# Patient Record
Sex: MALE | Race: WHITE | RURAL
[De-identification: names, ages, dates, MRNs, and addresses within clinical notes are randomized per-mention and may not be internally consistent; named-entity substitution may affect disease eponyms.]

---

## 2018-06-11 ENCOUNTER — OFFICE VISIT (OUTPATIENT)
Dept: FAMILY MEDICINE CLINIC | Age: 68
End: 2018-06-11

## 2018-06-11 VITALS
DIASTOLIC BLOOD PRESSURE: 60 MMHG | OXYGEN SATURATION: 94 % | RESPIRATION RATE: 17 BRPM | SYSTOLIC BLOOD PRESSURE: 110 MMHG | HEART RATE: 64 BPM | BODY MASS INDEX: 29.66 KG/M2 | WEIGHT: 223.8 LBS | HEIGHT: 73 IN

## 2018-06-11 DIAGNOSIS — Z11.59 NEED FOR HEPATITIS C SCREENING TEST: ICD-10-CM

## 2018-06-11 DIAGNOSIS — Z13.6 ENCOUNTER FOR LIPID SCREENING FOR CARDIOVASCULAR DISEASE: ICD-10-CM

## 2018-06-11 DIAGNOSIS — G89.29 CHRONIC PAIN OF RIGHT ANKLE: Primary | ICD-10-CM

## 2018-06-11 DIAGNOSIS — Z13.220 ENCOUNTER FOR LIPID SCREENING FOR CARDIOVASCULAR DISEASE: ICD-10-CM

## 2018-06-11 DIAGNOSIS — E04.9 GOITER: ICD-10-CM

## 2018-06-11 DIAGNOSIS — M25.571 CHRONIC PAIN OF RIGHT ANKLE: Primary | ICD-10-CM

## 2018-06-11 NOTE — PROGRESS NOTES
Chief Complaint   Patient presents with    Foot Swelling     lump on the back of right foot. No painful, just sensitive. HPI:      Richelle Lucero is a 74 y.o. male. Goes by [employer]. History of overactive thyroid with a goiter. Did not get US ordered previously. New Issues:  He has a \"growth\" on his right heel that he would like looked at. He first noticed it a few months ago. Seems to be where his tendon and muscle meet. Feels like a hard lump. Worse depending on what shoes he is wearing. Sensitive to the touch, but not painful. No Known Allergies    No current outpatient prescriptions on file. No current facility-administered medications for this visit. Past Medical History:   Diagnosis Date    Fx wrist     left    Tick bite     2011       No past surgical history on file. Social History     Social History    Marital status:      Spouse name: N/A    Number of children: N/A    Years of education: N/A     Social History Main Topics    Smoking status: Never Smoker    Smokeless tobacco: Never Used    Alcohol use Yes      Comment: rare    Drug use: No    Sexual activity: Not Asked     Other Topics Concern    None     Social History Narrative       Family History   Problem Relation Age of Onset    Cancer Brother      colon    Heart Disease Neg Hx     Diabetes Neg Hx        Above history reviewed. ROS:  Denies fever, chills, cough, chest pain, SOB,  nausea, vomiting, or diarrhea. Denies wt loss, wt gain, hemoptysis, hematochezia or melena.     Physical Examination:    /60 (BP 1 Location: Left arm, BP Patient Position: Sitting)  Pulse 64  Resp 17  Ht 6' 0.5\" (1.842 m)  Wt 223 lb 12.8 oz (101.5 kg)  SpO2 94%  BMI 29.94 kg/m2    General: Alert and Ox3, Fluent speech  Neck:  Supple, no adenopathy, JVD, mass or bruit  Chest:  Clear to Ausculation, without wheezes, rales, rubs or ronchi  Cardiac: RRR  Extremities:  No cyanosis, clubbing or edema  Ankle Exam:  Right ankle: No erythema, edema, or bruising. Nontender to medial malleolus. Nontender to lateral malleolus. No tenderness to 5th metatarsal base. Nontender to ATF tendon. Nontender to posterior tibial tendon. Nontender to talar dome. No laxity to tibiotalar joint. Dime sized hard lump lateral to achilles tendon insertion. Neurologic:  Ambulatory without assist, CN 2-12 grossly intact. Moves all extremities. Skin: no rash  Lymphadenopathy: no cervical or supraclavicular nodes    ASSESSMENT AND PLAN:     1. Chronic pain of right ankle  Appears to be a bony boss. Not bothering patient much right now  Would consider heel x-rays in the future. 2. Goiter  Patient did not get thyroid US done.    - THYROID PANEL W/TSH  - METABOLIC PANEL, COMPREHENSIVE    3.  Encounter for lipid screening for cardiovascular disease  - LIPID PANEL    4. Need for hepatitis C screening test  - HEPATITIS C AB     RTC PRN    Maryse Mclean, GIOVANI

## 2018-06-12 ENCOUNTER — TELEPHONE (OUTPATIENT)
Dept: FAMILY MEDICINE CLINIC | Age: 68
End: 2018-06-12

## 2018-06-12 LAB
ALBUMIN SERPL-MCNC: 3.7 G/DL (ref 3.6–4.8)
ALBUMIN/GLOB SERPL: 1.4 {RATIO} (ref 1.2–2.2)
ALP SERPL-CCNC: 67 IU/L (ref 39–117)
ALT SERPL-CCNC: 16 IU/L (ref 0–44)
AST SERPL-CCNC: 17 IU/L (ref 0–40)
BILIRUB SERPL-MCNC: 0.3 MG/DL (ref 0–1.2)
BUN SERPL-MCNC: 17 MG/DL (ref 8–27)
BUN/CREAT SERPL: 20 (ref 10–24)
CALCIUM SERPL-MCNC: 8.9 MG/DL (ref 8.6–10.2)
CHLORIDE SERPL-SCNC: 105 MMOL/L (ref 96–106)
CHOLEST SERPL-MCNC: 227 MG/DL (ref 100–199)
CO2 SERPL-SCNC: 24 MMOL/L (ref 20–29)
CREAT SERPL-MCNC: 0.85 MG/DL (ref 0.76–1.27)
FT4I SERPL CALC-MCNC: 1.3 (ref 1.2–4.9)
GFR SERPLBLD CREATININE-BSD FMLA CKD-EPI: 106 ML/MIN/1.73
GFR SERPLBLD CREATININE-BSD FMLA CKD-EPI: 91 ML/MIN/1.73
GLOBULIN SER CALC-MCNC: 2.6 G/DL (ref 1.5–4.5)
GLUCOSE SERPL-MCNC: 110 MG/DL (ref 65–99)
HCV AB S/CO SERPL IA: <0.1 S/CO RATIO (ref 0–0.9)
HDLC SERPL-MCNC: 60 MG/DL
LDLC SERPL CALC-MCNC: 137 MG/DL (ref 0–99)
POTASSIUM SERPL-SCNC: 4.4 MMOL/L (ref 3.5–5.2)
PROT SERPL-MCNC: 6.3 G/DL (ref 6–8.5)
SODIUM SERPL-SCNC: 140 MMOL/L (ref 134–144)
T3RU NFR SERPL: 26 % (ref 24–39)
T4 SERPL-MCNC: 5.1 UG/DL (ref 4.5–12)
TRIGL SERPL-MCNC: 150 MG/DL (ref 0–149)
TSH SERPL DL<=0.005 MIU/L-ACNC: 1.32 UIU/ML (ref 0.45–4.5)
VLDLC SERPL CALC-MCNC: 30 MG/DL (ref 5–40)

## 2018-06-12 NOTE — PROGRESS NOTES
Cholesterol is elevated. Not high enough to push a statin. Work on cutting back on foods high in cholesterol and exercise. Thyroid panel looks normal.  Good. Liver and kidneys look good.   Negative for Hepatitis C.

## 2018-06-12 NOTE — TELEPHONE ENCOUNTER
----- Message from Deny Rodriguez NP sent at 6/12/2018 10:09 AM EDT -----  Cholesterol is elevated. Not high enough to push a statin. Work on cutting back on foods high in cholesterol and exercise. Thyroid panel looks normal.  Good. Liver and kidneys look good.   Negative for Hepatitis C.